# Patient Record
Sex: MALE | URBAN - METROPOLITAN AREA
[De-identification: names, ages, dates, MRNs, and addresses within clinical notes are randomized per-mention and may not be internally consistent; named-entity substitution may affect disease eponyms.]

---

## 2017-01-30 ENCOUNTER — OFFICE VISIT (OUTPATIENT)
Dept: NEUROSURGERY | Facility: CLINIC | Age: 42
End: 2017-01-30

## 2017-01-30 VITALS — HEART RATE: 75 BPM | SYSTOLIC BLOOD PRESSURE: 124 MMHG | HEIGHT: 73 IN | DIASTOLIC BLOOD PRESSURE: 75 MMHG

## 2017-01-30 DIAGNOSIS — M54.16 LUMBAR RADICULAR PAIN: ICD-10-CM

## 2017-01-30 DIAGNOSIS — M50.10 CERVICAL RADICULOPATHY DUE TO INTERVERTEBRAL DISC DISORDER: Primary | ICD-10-CM

## 2017-01-30 RX ORDER — OXYCODONE HYDROCHLORIDE 20 MG/1
TABLET ORAL
COMMUNITY
Start: 2016-12-01

## 2017-01-30 ASSESSMENT — PAIN SCALES - GENERAL: PAINLEVEL: SEVERE PAIN (7)

## 2017-01-30 NOTE — Clinical Note
1/30/2017       RE: Joe Elam  [address]  [facility] [address]     Dear Colleague,    Thank you for referring your patient, Joe Elam, to the [facility] NEUROSURGERY at [facility]. Please see a copy of my visit note below.      DRAFT   Neurosurgery clinic progress note  Date of visit: 1/30/2017      Date of Surgery:10/05/2016  by Dr Mejias @[facility].  Procedure: 1.  C6-C7 anterior cervical diskectomy and artificial disk implantation.  Implants:  LDR Mobi-C artificial disk  HPI Joe Elam is a pleasant 41 year old male now 3 1/2 months status post the above procedure for neck pain radiating into his arms bilaterally.  An EMG nerve conduction study demonstrated a left C7 radiculopathy while his MRI showed a right-sided disk herniation at C6-C7.  Given his bilateral symptoms and clinical findings, surgical intervention was offered.  The procedure itself was without incident. He recovered well and was discharged home on DOS in good condition.  Since then he is doing better, his preop head ache pain has reduced considerably. His arm pain is gone, he has residual arm numbness.  He also wants to discuss ongoing lumbar back pain present constantly along the entire lumbar axial spine, plus he has leg pain sometimes to the back of the knee.  (Shooting type pain when it happens, but he has a hard time defining when it happens, it's pretty inconsistent).  He presents for routine post op visit.    Patient Supplied Answers To the UC Pain Questionnaire  UC Pain -  Patient Entered Questionnaire/Answers 1/30/2017   What number best describes your pain right now:  0 = No pain  to  10 = Worst pain imaginable 6         WORK:          Not working  ACTIVITY:      Has been normalizing activity   MEDS:       Pain           Oxycodone, tizanidine       NSAIDS  SWALLOW: normal  VOICE:         normal  NICOTINE:   Daily smoker  PAIN MANAGEMENT:  Dr Youssef, PCP    Current outpatient  "prescriptions:      oxyCODONE HCl (ROXICODONE) 20 MG TABS immediate release tablet, , Disp: , Rfl:      methylPREDNISolone (MEDROL DOSEPAK) 4 MG tablet, Follow package instructions, Disp: 21 tablet, Rfl: 0     albuterol (PROAIR HFA, PROVENTIL HFA, VENTOLIN HFA) 108 (90 BASE) MCG/ACT inhaler, Inhale 3 puffs into the lungs every 6 hours as needed for wheezing, Disp: , Rfl: 0     albuterol (2.5 MG/3ML) 0.083% nebulizer solution, Take 1 vial by nebulization every 6 hours as needed for shortness of breath / dyspnea or wheezing, Disp: , Rfl:      cetirizine (ZYRTEC) 10 MG tablet, Take 10 mg by mouth every morning , Disp: , Rfl:      CLONAZEPAM PO, Take 1 mg by mouth 2 times daily , Disp: , Rfl:      Docusate Sodium (DOCQLACE PO), Take by mouth 2 times daily , Disp: , Rfl:      mometasone-formoterol (DULERA) 100-5 MCG/ACT oral inhaler, Inhale 2 puffs into the lungs 2 times daily, Disp: , Rfl:      escitalopram (LEXAPRO) 10 MG tablet, Take 10 mg by mouth every morning , Disp: , Rfl:      fluticasone (FLONASE) 50 MCG/ACT nasal spray, Spray 2 sprays into both nostrils 2 times daily , Disp: , Rfl:      hydrocortisone (WESTCORT) 0.2 % cream, Apply topically 2 times daily, Disp: , Rfl:      OXYCODONE HCL PO, Take 5 mg by mouth every 4 hours as needed, Disp: , Rfl:      TIZANIDINE HCL PO, Take 2 mg by mouth every 6 hours as needed , Disp: , Rfl:      triamcinolone (KENALOG) 0.1 % cream, Apply topically 2 times daily, Disp: , Rfl:      albuterol (PROAIR HFA, PROVENTIL HFA, VENTOLIN HFA) 108 (90 BASE) MCG/ACT inhaler, Inhale 2 puffs into the lungs every 6 hours, Disp: , Rfl:   Allergies   Allergen Reactions     Animal Dander      And seasonal allergies to trees      Bees      Latex Hives     From bandaids and gloves    PMH, FAM HIST, SOC HIST, PROBLEM LIST:  All reviewed in EPIC.    OBJECTIVE:  /75 mmHg  Pulse 75  Ht 1.854 m (6' 1\")    Imaging:   These are the pertinent radiologist's findings from:  Xrays C spine taken " today.   Overall alignment of the spine in 2 planes is satisfactory and unchanged from post op. Hardware is in good position without evidence failure.    Please see Epic for the bulk of the report.  I personally reviewed the images with the patient.     EXAM:  Well developed well nourished male found seated comfortably in exam chair.  No apparent distress. He is accompanied by his mother.  A&O X3.  Mood and affect WNL. Language and fund of knowledge intact.  Is able to sit and rise independently.   Beautifully healed incision.     Upper Extremity Strength.                RIGHT                LEFT     Deltoid              5/5                   5/5       Biceps              5/5                   5/5        Triceps              5/5                   5/5       Wrist Extensor              5/5                   5/5                     5/5                    5/5       Interossei              5/5                   5/5       EPL              5/5                    5/5       Pinch              5/5                  5/5             Lower Extremity Strength                   RIGHT                   LEFT     Iliopsoas                    5/5                      5/5       Quad                    5/5                        5/5       Hamstring                      5/5                        5/5         Gastrocs                    5/5                        5/5       Tib. Anterior                     5/5                        5/5       EHL                      5/5                        5/5                 DTRs 1/4 throughout.     Sensation.    Gait.  Right antalgia that changes to left antalgia when he walks down the renee and back again.  2 positive Waddells, though overreaction to testing is a soft sign.    non anatomic tenderness;     overreaction to testing, (dramatized antalgia, holding one arm out for balance when walking without a cane.)      Assessment:  1. Cervical radiculopathy due to intervertebral disc disorder    2.       Joe Elam is doing well, much of his headache pain and arm pain is gone.   3.       He has lumbar back pain. His imaging is a year old.  4.       The wound is healthy.      PLAN:  Overview:  We discussed activities and return to work.  *  He can return to normalized activities re: his neck.    *  He can return to driving if: he is off narcotics.  We discussed medication.    *  FYI: In general we prescribe narcotics for pain management in the post operative recovery phase generally 2-6 weeks post op, depending on the surgery performed.  Pre-op our patients are advised that by 6 weeks post op we anticipate they will no longer require narcotic.    For pain outside the scope of these parameters we refer the patient back to his/her other providers for ongoing narcotic needs.   *   Medications prescribed today:  None  We discussed follow up.    *  We'll order a new lumbar MRI. Should be done on a closed magnet of at least 1.5 T or higher.  *  We will see him again after his new lumbar MRI    *  All the patient's questions have been answered and they demonstrate good understanding of the above.   *  Joe Elam  has our contact information and is aware that he should call if he has questions comments or concerns.   We appreciate the opportunity to be of service in the care of this pleasant patient  Please do call if there is anything more we can do.    Brittni Quintanilla PA-C  [facility]  Department of Neurosurgery  Phone: [phone]  Fax: [phone]      Total time: 30 minutes: counseling time greater than 20 minutes for discussion of pain management, biomechanics of the spine, good spine health habits, importance of exercise, film review, medication use, further follow up and answering questions.

## 2017-01-30 NOTE — MR AVS SNAPSHOT
After Visit Summary   1/30/2017    Joe Elam    MRN: [id number]           Patient Information     Date Of Birth          1975        Visit Information        Provider Department      1/30/2017 9:30 AM Brittni Quintanilla PA-C [facility] Neurosurgery        Today's Diagnoses     Cervical radiculopathy due to intervertebral disc disorder    -  1     Lumbar radicular pain            Follow-ups after your visit        Follow-up notes from your care team     Return in about 2 weeks (around 2/13/2017) for Follow-up after testing.      Your next 10 appointments already scheduled     Jan 30, 2017 11:35 AM   (Arrive by 11:20 AM)   XR CERVICAL SPINE 2/3 VIEWS with [facility]   [facility] Xray ([facility] [place])    [address] [facility]  [address] [facility] [address] [phone]           Please bring a list of your current medicines to your exam. (Include vitamins, minerals and over-thecounter medicines.) Leave your valuables at home.  Tell your doctor if there is a chance you may be pregnant.  You do not need to do anything special for this exam.            Feb 28, 2017 10:00 AM   (Arrive by 9:45 AM)   Return Visit with ANTONIO Day [facility] Neurosurgery ([facility] [place])    [address] [address] [phone]              Who to contact     Please call your clinic at [phone] to:    Ask questions about your health    Make or cancel appointments    Discuss your medicines    Learn about your test results    Speak to your doctor   If you have compliments or concerns about an experience at your clinic, or if you wish to file a complaint, please contact [facility] Patient Relations at [phone] or email us at [email or web address]         Additional Information About Your Visit        MyChart Information     [employer] is an electronic gateway that provides easy,  "online access to your medical records. With [employer], you can request a clinic appointment, read your test results, renew a prescription or communicate with your care team.     To sign up for [employer] visit the website at [email or web address]   You will be asked to enter the access code listed below, as well as some personal information. Please follow the directions to create your username and password.     Your access code is: [email or web address]  Expires: 2017  6:30 AM     Your access code will  in 90 days. If you need help or a new code, please contact your [facility] or call [phone] for assistance.        Care EveryWhere ID     This is your Care EveryWhere ID. This could be used by other organizations to access your [facility] medical records  [id number]        Your Vitals Were     Pulse Height                75 1.854 m (6' 1\")           Blood Pressure from Last 3 Encounters:   17 124/75   10/05/16 129/79   16 113/74    Weight from Last 3 Encounters:   10/05/16 118.2 kg (260 lb 9.3 oz)   16 122.471 kg (270 lb)   16 122.471 kg (270 lb)              We Performed the Following     X-ray Cervical spine 2-3 [facility]        Primary Care Provider Office Phone # Fax #    Allen Huber [phone]       [facility] [address] [place] [id number]        Thank you!     Thank you for choosing [facility]  for your care. Our goal is always to provide you with excellent care. Hearing back from our patients is one way we can continue to improve our services. Please take a few minutes to complete the written survey that you may receive in the mail after your visit with us. Thank you!             Your Updated Medication List - Protect others around you: Learn how to safely use, store and throw away your medicines at www.disposemymeds.org.          This list is accurate as of: 17 11:21 AM.  Always use your most recent med " list.                   Brand Name Dispense Instructions for use    * albuterol (2.5 MG/3ML) 0.083% neb solution      Take 1 vial by nebulization every 6 hours as needed for shortness of breath / dyspnea or wheezing       * albuterol 108 (90 BASE) MCG/ACT Inhaler    PROAIR HFA/PROVENTIL HFA/VENTOLIN HFA     Inhale 2 puffs into the lungs every 6 hours       * albuterol 108 (90 BASE) MCG/ACT Inhaler    PROAIR HFA/PROVENTIL HFA/VENTOLIN HFA     Inhale 3 puffs into the lungs every 6 hours as needed for wheezing       cetirizine 10 MG tablet    zyrTEC     Take 10 mg by mouth every morning       CLONAZEPAM PO      Take 1 mg by mouth 2 times daily       DOCQLACE PO      Take by mouth 2 times daily       escitalopram 10 MG tablet    LEXAPRO     Take 10 mg by mouth every morning       fluticasone 50 MCG/ACT spray    FLONASE     Spray 2 sprays into both nostrils 2 times daily       hydrocortisone 0.2 % cream    WESTCORT     Apply topically 2 times daily       methylPREDNISolone 4 MG tablet    MEDROL DOSEPAK    21 tablet    Follow package instructions       mometasone-formoterol 100-5 MCG/ACT oral inhaler    DULERA     Inhale 2 puffs into the lungs 2 times daily       * OXYCODONE HCL PO      Take 5 mg by mouth every 4 hours as needed       * oxyCODONE HCl 20 MG Tabs immediate release tablet    ROXICODONE         TIZANIDINE HCL PO      Take 2 mg by mouth every 6 hours as needed       triamcinolone 0.1 % cream    KENALOG     Apply topically 2 times daily       * Notice:  This list has 5 medication(s) that are the same as other medications prescribed for you. Read the directions carefully, and ask your doctor or other care provider to review them with you.

## 2017-01-30 NOTE — PROGRESS NOTES
DRAFT   Neurosurgery clinic progress note  Date of visit: 1/30/2017      Date of Surgery:10/05/2016  by Dr Mejias @[facility].  Procedure: 1.  C6-C7 anterior cervical diskectomy and artificial disk implantation.  Implants:  LDR Mobi-C artificial disk  HPI Joe Elam is a pleasant 41 year old male now 3 1/2 months status post the above procedure for neck pain radiating into his arms bilaterally.  An EMG nerve conduction study demonstrated a left C7 radiculopathy while his MRI showed a right-sided disk herniation at C6-C7.  Given his bilateral symptoms and clinical findings, surgical intervention was offered.  The procedure itself was without incident. He recovered well and was discharged home on DOS in good condition.  Since then he is doing better, his preop head ache pain has reduced considerably. His arm pain is gone, he has residual arm numbness.  He also wants to discuss ongoing lumbar back pain present constantly along the entire lumbar axial spine, plus he has leg pain sometimes to the back of the knee.  (Shooting type pain when it happens, but he has a hard time defining when it happens, it's pretty inconsistent).  He presents for routine post op visit.    Patient Supplied Answers To the UC Pain Questionnaire  [facility] Pain -  Patient Entered Questionnaire/Answers 1/30/2017   What number best describes your pain right now:  0 = No pain  to  10 = Worst pain imaginable 6         WORK:          Not working  ACTIVITY:      Has been normalizing activity   MEDS:       Pain           Oxycodone, tizanidine       NSAIDS  SWALLOW: normal  VOICE:         normal  NICOTINE:   Daily smoker  PAIN MANAGEMENT:  Dr Youssef, PCP    Current outpatient prescriptions:      oxyCODONE HCl (ROXICODONE) 20 MG TABS immediate release tablet, , Disp: , Rfl:      methylPREDNISolone (MEDROL DOSEPAK) 4 MG tablet, Follow package instructions, Disp: 21 tablet, Rfl: 0     albuterol (PROAIR HFA, PROVENTIL HFA, VENTOLIN HFA) 108 (90 BASE) MCG/ACT inhaler,  "Inhale 3 puffs into the lungs every 6 hours as needed for wheezing, Disp: , Rfl: 0     albuterol (2.5 MG/3ML) 0.083% nebulizer solution, Take 1 vial by nebulization every 6 hours as needed for shortness of breath / dyspnea or wheezing, Disp: , Rfl:      cetirizine (ZYRTEC) 10 MG tablet, Take 10 mg by mouth every morning , Disp: , Rfl:      CLONAZEPAM PO, Take 1 mg by mouth 2 times daily , Disp: , Rfl:      Docusate Sodium (DOCQLACE PO), Take by mouth 2 times daily , Disp: , Rfl:      mometasone-formoterol (DULERA) 100-5 MCG/ACT oral inhaler, Inhale 2 puffs into the lungs 2 times daily, Disp: , Rfl:      escitalopram (LEXAPRO) 10 MG tablet, Take 10 mg by mouth every morning , Disp: , Rfl:      fluticasone (FLONASE) 50 MCG/ACT nasal spray, Spray 2 sprays into both nostrils 2 times daily , Disp: , Rfl:      hydrocortisone (WESTCORT) 0.2 % cream, Apply topically 2 times daily, Disp: , Rfl:      OXYCODONE HCL PO, Take 5 mg by mouth every 4 hours as needed, Disp: , Rfl:      TIZANIDINE HCL PO, Take 2 mg by mouth every 6 hours as needed , Disp: , Rfl:      triamcinolone (KENALOG) 0.1 % cream, Apply topically 2 times daily, Disp: , Rfl:      albuterol (PROAIR HFA, PROVENTIL HFA, VENTOLIN HFA) 108 (90 BASE) MCG/ACT inhaler, Inhale 2 puffs into the lungs every 6 hours, Disp: , Rfl:   Allergies   Allergen Reactions     Animal Dander      And seasonal allergies to trees      Bees      Latex Hives     From bandaids and gloves    PMH, FAM HIST, SOC HIST, PROBLEM LIST:  All reviewed in EPIC.    OBJECTIVE:  /75 mmHg  Pulse 75  Ht 1.854 m (6' 1\")    Imaging:   These are the pertinent radiologist's findings from:  Xrays C spine taken today.   Overall alignment of the spine in 2 planes is satisfactory and unchanged from post op. Hardware is in good position without evidence failure.    Please see Epic for the bulk of the report.  I personally reviewed the images with the patient.     EXAM:  Well developed well nourished male " found seated comfortably in exam chair.  No apparent distress. He is accompanied by his mother.  A&O X3.  Mood and affect WNL. Language and fund of knowledge intact.  Is able to sit and rise independently.   Beautifully healed incision.     Upper Extremity Strength.                RIGHT                LEFT     Deltoid              5/5                   5/5       Biceps              5/5                   5/5        Triceps              5/5                   5/5       Wrist Extensor              5/5                   5/5                     5/5                    5/5       Interossei              5/5                   5/5       EPL              5/5                    5/5       Pinch              5/5                  5/5             Lower Extremity Strength                   RIGHT                   LEFT     Iliopsoas                    5/5                      5/5       Quad                    5/5                        5/5       Hamstring                      5/5                        5/5         Gastrocs                    5/5                        5/5       Tib. Anterior                     5/5                        5/5       EHL                      5/5                        5/5                 DTRs 1/4 throughout.     Sensation.    Gait.  Right antalgia that changes to left antalgia when he walks down the renee and back again.  2 positive Waddells, though overreaction to testing is a soft sign.    non anatomic tenderness;     overreaction to testing, (dramatized antalgia, holding one arm out for balance when walking without a cane.)      Assessment:  1. Cervical radiculopathy due to intervertebral disc disorder    2.      Joe Elam is doing well, much of his headache pain and arm pain is gone.   3.       He has lumbar back pain. His imaging is a year old.  4.       The wound is healthy.      PLAN:  Overview:  We discussed activities and return to work.  *  He can return to normalized activities re: his  neck.    *  He can return to driving if: he is off narcotics.  We discussed medication.    *  FYI: In general we prescribe narcotics for pain management in the post operative recovery phase generally 2-6 weeks post op, depending on the surgery performed.  Pre-op our patients are advised that by 6 weeks post op we anticipate they will no longer require narcotic.    For pain outside the scope of these parameters we refer the patient back to his/her other providers for ongoing narcotic needs.   *   Medications prescribed today:  None  We discussed follow up.    *  We'll order a new lumbar MRI. Should be done on a closed magnet of at least 1.5 T or higher.  *  We will see him again after his new lumbar MRI    *  All the patient's questions have been answered and they demonstrate good understanding of the above.   *  Joe Tolentinoremy  has our contact information and is aware that he should call if he has questions comments or concerns.   We appreciate the opportunity to be of service in the care of this pleasant patient  Please do call if there is anything more we can do.    Brittni Quintanilla PA-C  [facility]  Department of Neurosurgery  Phone: [phone]  Fax: [phone]      Total time: 30 minutes: counseling time greater than 20 minutes for discussion of pain management, biomechanics of the spine, good spine health habits, importance of exercise, film review, medication use, further follow up and answering questions.

## 2017-03-27 ENCOUNTER — TELEPHONE (OUTPATIENT)
Dept: NEUROSURGERY | Facility: CLINIC | Age: 42
End: 2017-03-27

## 2017-03-27 NOTE — TELEPHONE ENCOUNTER
Joe left a  asking to cancel the appointment with Dr. Mejias 3/27, he did not have a MRI scheduled down here the same day prior.  Upon calling back he is working up a lower back issue and will see Dr. Mejias after.  Sent this request to scheduling.  He also had a cervical CT scan at [facility] 11/2016, that is not in our system, sent Joe a AMOS.  To send the AMOS to [phone].

## 2017-04-05 ENCOUNTER — TELEPHONE (OUTPATIENT)
Dept: NEUROSURGERY | Facility: CLINIC | Age: 42
End: 2017-04-05

## 2017-04-06 ENCOUNTER — PRE VISIT (OUTPATIENT)
Dept: NEUROSURGERY | Facility: CLINIC | Age: 42
End: 2017-04-06

## 2017-04-06 NOTE — TELEPHONE ENCOUNTER
1.  Date/reason for appt:      2.  Referring provider:      3.  Call to patient (Yes / No - short description):       4.  Previous care at / records requested from:

## 2017-06-12 ENCOUNTER — OFFICE VISIT (OUTPATIENT)
Dept: NEUROSURGERY | Facility: CLINIC | Age: 42
End: 2017-06-12

## 2017-06-12 VITALS
HEIGHT: 73 IN | DIASTOLIC BLOOD PRESSURE: 74 MMHG | SYSTOLIC BLOOD PRESSURE: 131 MMHG | HEART RATE: 66 BPM | BODY MASS INDEX: 35.43 KG/M2 | WEIGHT: 267.3 LBS

## 2017-06-12 DIAGNOSIS — M54.12 CERVICAL RADICULOPATHY AT C7: Primary | ICD-10-CM

## 2017-06-12 ASSESSMENT — PAIN SCALES - GENERAL: PAINLEVEL: MODERATE PAIN (5)

## 2017-06-12 NOTE — MR AVS SNAPSHOT
"              After Visit Summary   2017    Joe Elam    MRN: [id number]           Patient Information     Date Of Birth          1975        Visit Information        Provider Department      2017 8:45 AM Marcelo Mejias MD [facility] Neurosurgery        Today's Diagnoses     Cervical radiculopathy at C7    -  1       Follow-ups after your visit        Who to contact     Please call your clinic at [phone] to:    Ask questions about your health    Make or cancel appointments    Discuss your medicines    Learn about your test results    Speak to your doctor   If you have compliments or concerns about an experience at your clinic, or if you wish to file a complaint, please contact [facility] Patient Relations at [phone] or email us at [email or web address]         Additional Information About Your Visit        MyChart Information     Cempra is an electronic gateway that provides easy, online access to your medical records. With Cempra, you can request a clinic appointment, read your test results, renew a prescription or communicate with your care team.     To sign up for Walk-in Appointment Schedulert visit the website at [email or web address]   You will be asked to enter the access code listed below, as well as some personal information. Please follow the directions to create your username and password.     Your access code is: [email or web address]  Expires: 2017  6:30 AM     Your access code will  in 90 days. If you need help or a new code, please contact your [facility] or call [phone] for assistance.        Care EveryWhere ID     This is your Care EveryWhere ID. This could be used by other organizations to access your [facility] medical records  [id number]        Your Vitals Were     Pulse Height BMI (Body Mass Index)             66 1.854 m (6' 1\") 35.27 kg/m2          Blood Pressure from Last 3 Encounters:   17 " 131/74   01/30/17 124/75   10/05/16 129/79    Weight from Last 3 Encounters:   06/12/17 121.2 kg (267 lb 4.8 oz)   10/05/16 118.2 kg (260 lb 9.3 oz)   09/26/16 122.5 kg (270 lb)              Today, you had the following     No orders found for display       Primary Care Provider Office Phone # Fax #    Allen Huber [phone]       [facility] [address] [facility] [address]        Equal Access to Services     [facility]: Hadii aad ku hadasho Soomaali, waaxda luqadaha, qaybta kaalmada adejoannyadavin, justin mccoy . So [facility] [phone].    ATENCIÓN: Si habla español, tiene a wyman disposición servicios gratuitos de asistencia lingüística. [facility] [phone].    We comply with applicable federal civil rights laws and [place] laws. We do not discriminate on the basis of race, color, national origin, age, disability sex, sexual orientation or gender identity.            Thank you!     Thank you for choosing [facility]  for your care. Our goal is always to provide you with excellent care. Hearing back from our patients is one way we can continue to improve our services. Please take a few minutes to complete the written survey that you may receive in the mail after your visit with us. Thank you!             Your Updated Medication List - Protect others around you: Learn how to safely use, store and throw away your medicines at www.disposemymeds.org.          This list is accurate as of: 6/12/17 11:59 PM.  Always use your most recent med list.                   Brand Name Dispense Instructions for use Diagnosis    * albuterol (2.5 MG/3ML) 0.083% neb solution      Take 1 vial by nebulization every 6 hours as needed for shortness of breath / dyspnea or wheezing        * albuterol 108 (90 BASE) MCG/ACT Inhaler    PROAIR HFA/PROVENTIL HFA/VENTOLIN HFA     Inhale 2 puffs into the lungs every 6 hours        * albuterol 108 (90 BASE) MCG/ACT Inhaler    PROAIR  HFA/PROVENTIL HFA/VENTOLIN HFA     Inhale 3 puffs into the lungs every 6 hours as needed for wheezing    Cervical radiculopathy due to intervertebral disc disorder       cetirizine 10 MG tablet    zyrTEC     Take 10 mg by mouth every morning        CLONAZEPAM PO      Take 1 mg by mouth 2 times daily        DOCQLACE PO      Take by mouth 2 times daily        escitalopram 10 MG tablet    LEXAPRO     Take 10 mg by mouth every morning        fluticasone 50 MCG/ACT spray    FLONASE     Spray 2 sprays into both nostrils 2 times daily        hydrocortisone 0.2 % cream    WESTCORT     Apply topically 2 times daily        methylPREDNISolone 4 MG tablet    MEDROL DOSEPAK    21 tablet    Follow package instructions    Cervical radiculopathy due to intervertebral disc disorder       mometasone-formoterol 100-5 MCG/ACT oral inhaler    DULERA     Inhale 2 puffs into the lungs 2 times daily        * OXYCODONE HCL PO      Take 5 mg by mouth every 4 hours as needed        * oxyCODONE HCl 20 MG Tabs immediate release tablet    ROXICODONE          TIZANIDINE HCL PO      Take 2 mg by mouth every 6 hours as needed        triamcinolone 0.1 % cream    KENALOG     Apply topically 2 times daily        * Notice:  This list has 5 medication(s) that are the same as other medications prescribed for you. Read the directions carefully, and ask your doctor or other care provider to review them with you.
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
## 2017-06-12 NOTE — NURSING NOTE
Chief Complaint   Patient presents with     RECHECK     [facility] RETURN - s/p C6 - C7 Anterior Cervical Diskectomy     Juliette Alanis MA
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
